# Patient Record
Sex: FEMALE | Race: WHITE | NOT HISPANIC OR LATINO | URBAN - METROPOLITAN AREA
[De-identification: names, ages, dates, MRNs, and addresses within clinical notes are randomized per-mention and may not be internally consistent; named-entity substitution may affect disease eponyms.]

---

## 2017-08-15 PROBLEM — 235595009 GASTROESOPHAGEAL REFLUX DISEASE: Status: ACTIVE | Noted: 2017-08-15

## 2017-08-15 PROBLEM — 38341003 HYPERTENSION: Status: ACTIVE | Noted: 2017-08-15

## 2017-08-15 PROBLEM — 3723001 ARTHRITIS: Status: ACTIVE | Noted: 2017-08-15

## 2017-08-15 PROBLEM — 313436004 TYPE II DIABETES MELLITUS WITHOUT COMPLICATION: Status: ACTIVE | Noted: 2017-08-15

## 2019-03-01 PROBLEM — 398050005 DIVERTICULAR DISEASE OF COLON: Status: ACTIVE | Noted: 2019-03-01

## 2019-03-20 PROBLEM — 134407002 CHRONIC BACK PAIN: Status: ACTIVE | Noted: 2019-03-20

## 2021-08-28 ENCOUNTER — TELEPHONE ENCOUNTER (OUTPATIENT)
Dept: URBAN - METROPOLITAN AREA CLINIC 13 | Facility: CLINIC | Age: 81
End: 2021-08-28

## 2021-08-28 RX ORDER — OMEPRAZOLE 40 MG/1
CAPSULE, DELAYED RELEASE ORAL
OUTPATIENT
Start: 2017-08-15 | End: 2019-02-28

## 2021-08-28 RX ORDER — MULTIVIT-MIN/FOLIC/VIT K/LYCOP 400-300MCG
TABLET ORAL
OUTPATIENT
End: 2017-08-15

## 2021-08-29 ENCOUNTER — TELEPHONE ENCOUNTER (OUTPATIENT)
Dept: URBAN - METROPOLITAN AREA CLINIC 13 | Facility: CLINIC | Age: 81
End: 2021-08-29

## 2021-08-29 RX ORDER — METFORMIN HYDROCHLORIDE 500 MG/1
TABLET ORAL
Status: ACTIVE | COMMUNITY

## 2021-08-29 RX ORDER — ASPIRIN 81 MG/1
TABLET, COATED ORAL
Status: ACTIVE | COMMUNITY

## 2021-08-29 RX ORDER — AMLODIPINE BESYLATE 5 MG/1
TABLET ORAL
Status: ACTIVE | COMMUNITY

## 2022-05-12 ENCOUNTER — DASHBOARD ENCOUNTERS (OUTPATIENT)
Age: 82
End: 2022-05-12

## 2022-05-12 ENCOUNTER — OFFICE VISIT (OUTPATIENT)
Dept: URBAN - METROPOLITAN AREA CLINIC 46 | Facility: CLINIC | Age: 82
End: 2022-05-12
Payer: MEDICARE

## 2022-05-12 VITALS
DIASTOLIC BLOOD PRESSURE: 69 MMHG | HEIGHT: 67 IN | HEART RATE: 89 BPM | SYSTOLIC BLOOD PRESSURE: 154 MMHG | WEIGHT: 174 LBS | BODY MASS INDEX: 27.31 KG/M2 | TEMPERATURE: 98 F

## 2022-05-12 DIAGNOSIS — Z86.010 PERSONAL HISTORY OF COLONIC POLYPS: ICD-10-CM

## 2022-05-12 DIAGNOSIS — R19.8 ALTERED BOWEL FUNCTION: ICD-10-CM

## 2022-05-12 DIAGNOSIS — R10.13 DYSPEPSIA: ICD-10-CM

## 2022-05-12 PROCEDURE — 99204 OFFICE O/P NEW MOD 45 MIN: CPT | Performed by: PHYSICIAN ASSISTANT

## 2022-05-12 RX ORDER — PANTOPRAZOLE SODIUM 40 MG/1
1 TABLET TABLET, DELAYED RELEASE ORAL
Qty: 30 | Refills: 2 | OUTPATIENT
Start: 2022-05-12

## 2022-05-12 RX ORDER — CLOBETASOL PROPIONATE 0.5 MG/G
1 APPLICATION CREAM TOPICAL TWICE A DAY
Status: ACTIVE | COMMUNITY

## 2022-05-12 RX ORDER — ASPIRIN 81 MG/1
TABLET, COATED ORAL
Status: ACTIVE | COMMUNITY

## 2022-05-12 RX ORDER — METFORMIN HYDROCHLORIDE 500 MG/1
1 TABLET WITH A MEAL TABLET ORAL TWICE A DAY
Status: ACTIVE | COMMUNITY

## 2022-05-12 RX ORDER — ZINC SULFATE 50(220)MG
1 CAPSULE CAPSULE ORAL ONCE A DAY
Status: ACTIVE | COMMUNITY

## 2022-05-12 RX ORDER — CALCIUM CARBONATE 500 MG/1
1 TABLET TABLET ORAL ONCE A DAY
Status: ACTIVE | COMMUNITY

## 2022-05-12 RX ORDER — AMLODIPINE BESYLATE 5 MG/1
1 TABLET TABLET ORAL ONCE A DAY
Status: ACTIVE | COMMUNITY

## 2022-05-12 RX ORDER — VIT A/VIT C/VIT E/ZINC/COPPER 7160-113
AS DIRECTED TABLET, DELAYED RELEASE (ENTERIC COATED) ORAL
Status: ACTIVE | COMMUNITY

## 2022-05-12 NOTE — HPI-TODAY'S VISIT:
Shameka Santos is an 81 y/o female who presents for colon consult and dyspepsia. Her last colonoscopy was done 3/2019 with removal of 3 TA polyps, and 3 year surveillance advised. She reports issues with her bowels in which she feels constipated in the mornings, and then will spend the entire morning having bowel movements which progress to loose stool. She states this has been going on chronically since she has been on Metformin years ago. She also c/o belching at night a few nights per week, and intermittent epigastric pain after meals. Weight is stable.

## 2022-05-12 NOTE — PHYSICAL EXAM CHEST:
Referral generated and mailed   no lesions,  no deformities,  no traumatic injuries,  no significant scars are present,  chest wall non-tender,  no masses present, breathing is unlabored without accessory muscle use,normal breath sounds

## 2022-05-31 PROBLEM — 88111009: Status: ACTIVE | Noted: 2022-05-31

## 2022-05-31 PROBLEM — 428283002: Status: ACTIVE | Noted: 2022-05-12

## 2022-05-31 PROBLEM — 162031009: Status: ACTIVE | Noted: 2022-05-31

## 2022-06-15 ENCOUNTER — OFFICE VISIT (OUTPATIENT)
Dept: URBAN - METROPOLITAN AREA SURGERY CENTER 28 | Facility: SURGERY CENTER | Age: 82
End: 2022-06-15

## 2024-10-16 ENCOUNTER — LAB OUTSIDE AN ENCOUNTER (OUTPATIENT)
Dept: URBAN - METROPOLITAN AREA CLINIC 48 | Facility: CLINIC | Age: 84
End: 2024-10-16

## 2024-10-16 ENCOUNTER — OFFICE VISIT (OUTPATIENT)
Dept: URBAN - METROPOLITAN AREA CLINIC 48 | Facility: CLINIC | Age: 84
End: 2024-10-16
Payer: COMMERCIAL

## 2024-10-16 VITALS
HEART RATE: 91 BPM | WEIGHT: 162 LBS | DIASTOLIC BLOOD PRESSURE: 87 MMHG | HEIGHT: 67 IN | BODY MASS INDEX: 25.43 KG/M2 | SYSTOLIC BLOOD PRESSURE: 160 MMHG | TEMPERATURE: 97.8 F

## 2024-10-16 DIAGNOSIS — Z86.0101 PERSONAL HISTORY OF ADENOMATOUS AND SERRATED COLON POLYPS: ICD-10-CM

## 2024-10-16 DIAGNOSIS — R10.13 EPIGASTRIC PAIN: ICD-10-CM

## 2024-10-16 DIAGNOSIS — K62.5 RECTAL BLEEDING: ICD-10-CM

## 2024-10-16 DIAGNOSIS — R19.4 CHANGE IN BOWEL HABIT: ICD-10-CM

## 2024-10-16 PROBLEM — 79922009: Status: ACTIVE | Noted: 2024-10-16

## 2024-10-16 PROBLEM — 12063002: Status: ACTIVE | Noted: 2024-10-16

## 2024-10-16 PROBLEM — 428283002: Status: ACTIVE | Noted: 2024-10-16

## 2024-10-16 PROBLEM — 88111009: Status: ACTIVE | Noted: 2024-10-16

## 2024-10-16 PROCEDURE — 99214 OFFICE O/P EST MOD 30 MIN: CPT | Performed by: NURSE PRACTITIONER

## 2024-10-16 RX ORDER — AMLODIPINE BESYLATE 5 MG/1
1 TABLET TABLET ORAL ONCE A DAY
Status: ACTIVE | COMMUNITY

## 2024-10-16 RX ORDER — ASPIRIN 81 MG/1
TABLET, COATED ORAL
Status: ACTIVE | COMMUNITY

## 2024-10-16 RX ORDER — MULTIVIT WITH MINERALS/LUTEIN
AS DIRECTED TABLET ORAL
Status: ACTIVE | COMMUNITY

## 2024-10-16 RX ORDER — ZINC SULFATE 50(220)MG
1 CAPSULE CAPSULE ORAL ONCE A DAY
Status: ACTIVE | COMMUNITY

## 2024-10-16 RX ORDER — SUCRALFATE 1 G/1
1 TABLET ON AN EMPTY STOMACH TABLET ORAL TWICE A DAY
Qty: 28 TABLET | Refills: 0 | OUTPATIENT
Start: 2024-10-16 | End: 2024-10-30

## 2024-10-16 RX ORDER — CALCIUM CARBONATE 500 MG/1
1 TABLET TABLET ORAL ONCE A DAY
Status: ACTIVE | COMMUNITY

## 2024-10-16 RX ORDER — METFORMIN HYDROCHLORIDE 500 MG/1
1 TABLET WITH A MEAL TABLET ORAL TWICE A DAY
Status: ACTIVE | COMMUNITY

## 2024-10-16 RX ORDER — OMEPRAZOLE 40 MG/1
1 CAPSULE 30 MINUTES BEFORE MORNING MEAL CAPSULE, DELAYED RELEASE ORAL ONCE A DAY
Qty: 30 | Refills: 1 | OUTPATIENT
Start: 2024-10-16

## 2024-10-16 NOTE — HPI-TODAY'S VISIT:
84 year old female presents for evaluation of rectal bleeding and epigastric pain. The patient has a hx of breast cancer diagnosed in 2021 status post lumpectomy and chemotherapy. Her PCP referred her for epigastric pain that she reports has occurred for a few months. Denies GERD or weight loss. About 2 years ago, she fell on the floor on her abdomen. She has noted 2-3 months of bright red blood on tissue and pad. It occurs once a week. The patient reports mild constipaiton intermittently. Currently, she is still on 81mg asa. Last colonoscopy was in 2019 and showed an adenomatous polyp and Grade 2 internal hemorrhoids. A 3 year surveillance was recommended. She had labwork today at her PCP but we do not have results.

## 2024-10-17 ENCOUNTER — TELEPHONE ENCOUNTER (OUTPATIENT)
Dept: URBAN - METROPOLITAN AREA CLINIC 46 | Facility: CLINIC | Age: 84
End: 2024-10-17

## 2025-01-30 ENCOUNTER — OFFICE VISIT (OUTPATIENT)
Dept: URBAN - METROPOLITAN AREA CLINIC 48 | Facility: CLINIC | Age: 85
End: 2025-01-30
Payer: COMMERCIAL

## 2025-01-30 VITALS
HEIGHT: 67 IN | WEIGHT: 160 LBS | SYSTOLIC BLOOD PRESSURE: 156 MMHG | HEART RATE: 90 BPM | BODY MASS INDEX: 25.11 KG/M2 | TEMPERATURE: 97.9 F | DIASTOLIC BLOOD PRESSURE: 79 MMHG

## 2025-01-30 DIAGNOSIS — K62.5 RECTAL BLEEDING: ICD-10-CM

## 2025-01-30 DIAGNOSIS — Z86.0101 PERSONAL HISTORY OF ADENOMATOUS AND SERRATED COLON POLYPS: ICD-10-CM

## 2025-01-30 DIAGNOSIS — R10.13 EPIGASTRIC PAIN: ICD-10-CM

## 2025-01-30 PROCEDURE — 99213 OFFICE O/P EST LOW 20 MIN: CPT | Performed by: NURSE PRACTITIONER

## 2025-01-30 RX ORDER — METFORMIN HYDROCHLORIDE 500 MG/1
1 TABLET WITH A MEAL TABLET ORAL TWICE A DAY
Status: ACTIVE | COMMUNITY

## 2025-01-30 RX ORDER — LETROZOLE 2.5 MG/1
1 TABLET TABLET, FILM COATED ORAL ONCE A DAY
Status: ACTIVE | COMMUNITY

## 2025-01-30 RX ORDER — OMEPRAZOLE 40 MG/1
1 CAPSULE 30 MINUTES BEFORE MORNING MEAL CAPSULE, DELAYED RELEASE ORAL ONCE A DAY
Qty: 30 | Refills: 1 | Status: ACTIVE | COMMUNITY
Start: 2024-10-16

## 2025-01-30 RX ORDER — ZINC SULFATE 50(220)MG
1 CAPSULE CAPSULE ORAL ONCE A DAY
Status: ACTIVE | COMMUNITY

## 2025-01-30 RX ORDER — OMEPRAZOLE 40 MG/1
1 CAPSULE 30 MINUTES BEFORE MORNING MEAL CAPSULE, DELAYED RELEASE ORAL ONCE A DAY
Qty: 30 | Refills: 1 | OUTPATIENT

## 2025-01-30 RX ORDER — ASPIRIN 81 MG/1
TABLET, COATED ORAL
Status: ACTIVE | COMMUNITY

## 2025-01-30 RX ORDER — AMLODIPINE BESYLATE 5 MG/1
1 TABLET TABLET ORAL ONCE A DAY
Status: ACTIVE | COMMUNITY

## 2025-01-30 NOTE — HPI-TODAY'S VISIT:
84 year old female presents for follow up of rectal bleeding and epigastric pain. The patient has a hx of breast cancer diagnosed in 2021 status post lumpectomy and chemotherapy. In 10/2024, she had 2-3 months of bright red blood on tissue and pad about once a week. Admits to a fall on her abdomen prior to bleeding. She has mild constipation. An EGD and colonoscopy were ordered but bleeding resolved and did not pursue procedures. Pantoprazole and Sucralfate were ordered but she lost them.  CT 12/2024 showed diverticulosis but no acute process. 10/2024 CBC and CMP were unremarkable. Last colonoscopy was in 2019 and showed an adenomatous polyp and Grade 2 internal hemorrhoids. A 3 year surveillance was recommended. Denies GERD or abdominal pain.